# Patient Record
Sex: FEMALE | URBAN - METROPOLITAN AREA
[De-identification: names, ages, dates, MRNs, and addresses within clinical notes are randomized per-mention and may not be internally consistent; named-entity substitution may affect disease eponyms.]

---

## 2017-01-01 ENCOUNTER — APPOINTMENT (OUTPATIENT)
Dept: GENERAL RADIOLOGY | Facility: CLINIC | Age: 82
DRG: 283 | End: 2017-01-01
Attending: EMERGENCY MEDICINE
Payer: MEDICARE

## 2017-01-01 ENCOUNTER — TRANSFERRED RECORDS (OUTPATIENT)
Dept: HEALTH INFORMATION MANAGEMENT | Facility: CLINIC | Age: 82
End: 2017-01-01

## 2017-01-01 ENCOUNTER — APPOINTMENT (OUTPATIENT)
Dept: CT IMAGING | Facility: CLINIC | Age: 82
DRG: 283 | End: 2017-01-01
Attending: EMERGENCY MEDICINE
Payer: MEDICARE

## 2017-01-01 ENCOUNTER — HOSPITAL ENCOUNTER (INPATIENT)
Facility: CLINIC | Age: 82
LOS: 1 days | DRG: 283 | End: 2017-09-22
Attending: EMERGENCY MEDICINE | Admitting: INTERNAL MEDICINE
Payer: MEDICARE

## 2017-01-01 VITALS
TEMPERATURE: 98.6 F | RESPIRATION RATE: 20 BRPM | SYSTOLIC BLOOD PRESSURE: 95 MMHG | DIASTOLIC BLOOD PRESSURE: 56 MMHG | HEART RATE: 72 BPM | OXYGEN SATURATION: 90 %

## 2017-01-01 DIAGNOSIS — I48.91 ATRIAL FIBRILLATION, UNSPECIFIED TYPE (H): ICD-10-CM

## 2017-01-01 DIAGNOSIS — I21.4 NON-ST ELEVATION (NSTEMI) MYOCARDIAL INFARCTION (H): ICD-10-CM

## 2017-01-01 DIAGNOSIS — R41.82 ALTERED MENTAL STATUS, UNSPECIFIED ALTERED MENTAL STATUS TYPE: ICD-10-CM

## 2017-01-01 LAB
ABO + RH BLD: NORMAL
ABO + RH BLD: NORMAL
ALBUMIN SERPL-MCNC: 3.1 G/DL (ref 3.4–5)
ALBUMIN UR-MCNC: NEGATIVE MG/DL
ALP SERPL-CCNC: 95 U/L (ref 40–150)
ALT SERPL W P-5'-P-CCNC: 29 U/L (ref 0–50)
ANION GAP SERPL CALCULATED.3IONS-SCNC: 9 MMOL/L (ref 3–14)
APPEARANCE UR: CLEAR
AST SERPL W P-5'-P-CCNC: 110 U/L (ref 0–45)
BASOPHILS # BLD AUTO: 0 10E9/L (ref 0–0.2)
BASOPHILS NFR BLD AUTO: 0.1 %
BILIRUB SERPL-MCNC: 0.7 MG/DL (ref 0.2–1.3)
BILIRUB UR QL STRIP: NEGATIVE
BLD GP AB SCN SERPL QL: NORMAL
BLOOD BANK CMNT PATIENT-IMP: NORMAL
BUN SERPL-MCNC: 43 MG/DL (ref 7–30)
CALCIUM SERPL-MCNC: 8.9 MG/DL (ref 8.5–10.1)
CHLORIDE SERPL-SCNC: 104 MMOL/L (ref 94–109)
CO2 SERPL-SCNC: 27 MMOL/L (ref 20–32)
COLOR UR AUTO: YELLOW
CREAT SERPL-MCNC: 1.92 MG/DL (ref 0.52–1.04)
DIFFERENTIAL METHOD BLD: ABNORMAL
EOSINOPHIL # BLD AUTO: 0 10E9/L (ref 0–0.7)
EOSINOPHIL NFR BLD AUTO: 0 %
ERYTHROCYTE [DISTWIDTH] IN BLOOD BY AUTOMATED COUNT: 14.4 % (ref 10–15)
GFR SERPL CREATININE-BSD FRML MDRD: 24 ML/MIN/1.7M2
GLUCOSE SERPL-MCNC: 147 MG/DL (ref 70–99)
GLUCOSE UR STRIP-MCNC: NEGATIVE MG/DL
GRAN CASTS #/AREA URNS LPF: 1 /LPF
HCT VFR BLD AUTO: 41.1 % (ref 35–47)
HGB BLD-MCNC: 13.2 G/DL (ref 11.7–15.7)
HGB UR QL STRIP: ABNORMAL
HYALINE CASTS #/AREA URNS LPF: 6 /LPF (ref 0–2)
IMM GRANULOCYTES # BLD: 0.1 10E9/L (ref 0–0.4)
IMM GRANULOCYTES NFR BLD: 0.4 %
INR PPP: 1.11 (ref 0.86–1.14)
INTERPRETATION ECG - MUSE: NORMAL
KETONES UR STRIP-MCNC: NEGATIVE MG/DL
LEUKOCYTE ESTERASE UR QL STRIP: NEGATIVE
LYMPHOCYTES # BLD AUTO: 1.9 10E9/L (ref 0.8–5.3)
LYMPHOCYTES NFR BLD AUTO: 8.8 %
MCH RBC QN AUTO: 31.5 PG (ref 26.5–33)
MCHC RBC AUTO-ENTMCNC: 32.1 G/DL (ref 31.5–36.5)
MCV RBC AUTO: 98 FL (ref 78–100)
MONOCYTES # BLD AUTO: 1 10E9/L (ref 0–1.3)
MONOCYTES NFR BLD AUTO: 4.8 %
NEUTROPHILS # BLD AUTO: 18.1 10E9/L (ref 1.6–8.3)
NEUTROPHILS NFR BLD AUTO: 85.9 %
NITRATE UR QL: NEGATIVE
NRBC # BLD AUTO: 0 10*3/UL
NRBC BLD AUTO-RTO: 0 /100
PH UR STRIP: 5 PH (ref 5–7)
PLATELET # BLD AUTO: 149 10E9/L (ref 150–450)
POTASSIUM SERPL-SCNC: 5.3 MMOL/L (ref 3.4–5.3)
PROT SERPL-MCNC: 6.5 G/DL (ref 6.8–8.8)
RBC # BLD AUTO: 4.19 10E12/L (ref 3.8–5.2)
RBC #/AREA URNS AUTO: 1 /HPF (ref 0–2)
SODIUM SERPL-SCNC: 140 MMOL/L (ref 133–144)
SOURCE: ABNORMAL
SP GR UR STRIP: 1.01 (ref 1–1.03)
SPECIMEN EXP DATE BLD: NORMAL
TROPONIN I SERPL-MCNC: 3.04 UG/L (ref 0–0.04)
UROBILINOGEN UR STRIP-MCNC: NORMAL MG/DL (ref 0–2)
WBC # BLD AUTO: 21.1 10E9/L (ref 4–11)
WBC #/AREA URNS AUTO: 0 /HPF (ref 0–2)

## 2017-01-01 PROCEDURE — 25000128 H RX IP 250 OP 636

## 2017-01-01 PROCEDURE — 25000128 H RX IP 250 OP 636: Performed by: EMERGENCY MEDICINE

## 2017-01-01 PROCEDURE — 85025 COMPLETE CBC W/AUTO DIFF WBC: CPT | Performed by: EMERGENCY MEDICINE

## 2017-01-01 PROCEDURE — 84484 ASSAY OF TROPONIN QUANT: CPT | Performed by: EMERGENCY MEDICINE

## 2017-01-01 PROCEDURE — 85610 PROTHROMBIN TIME: CPT | Performed by: EMERGENCY MEDICINE

## 2017-01-01 PROCEDURE — 93005 ELECTROCARDIOGRAM TRACING: CPT

## 2017-01-01 PROCEDURE — 25000128 H RX IP 250 OP 636: Performed by: INTERNAL MEDICINE

## 2017-01-01 PROCEDURE — 40000275 ZZH STATISTIC RCP TIME EA 10 MIN

## 2017-01-01 PROCEDURE — 70450 CT HEAD/BRAIN W/O DYE: CPT

## 2017-01-01 PROCEDURE — 76705 ECHO EXAM OF ABDOMEN: CPT

## 2017-01-01 PROCEDURE — 86850 RBC ANTIBODY SCREEN: CPT | Performed by: EMERGENCY MEDICINE

## 2017-01-01 PROCEDURE — 71010 XR CHEST PORT 1 VW: CPT

## 2017-01-01 PROCEDURE — 99223 1ST HOSP IP/OBS HIGH 75: CPT | Mod: AI | Performed by: INTERNAL MEDICINE

## 2017-01-01 PROCEDURE — 86901 BLOOD TYPING SEROLOGIC RH(D): CPT | Performed by: EMERGENCY MEDICINE

## 2017-01-01 PROCEDURE — 72125 CT NECK SPINE W/O DYE: CPT

## 2017-01-01 PROCEDURE — 96374 THER/PROPH/DIAG INJ IV PUSH: CPT

## 2017-01-01 PROCEDURE — 80053 COMPREHEN METABOLIC PANEL: CPT | Performed by: EMERGENCY MEDICINE

## 2017-01-01 PROCEDURE — 12000000 ZZH R&B MED SURG/OB

## 2017-01-01 PROCEDURE — 99285 EMERGENCY DEPT VISIT HI MDM: CPT | Mod: 25

## 2017-01-01 PROCEDURE — 86900 BLOOD TYPING SEROLOGIC ABO: CPT | Performed by: EMERGENCY MEDICINE

## 2017-01-01 PROCEDURE — 81001 URINALYSIS AUTO W/SCOPE: CPT | Performed by: EMERGENCY MEDICINE

## 2017-01-01 RX ORDER — BISACODYL 10 MG
10 SUPPOSITORY, RECTAL RECTAL
Status: DISCONTINUED | OUTPATIENT
Start: 2017-09-25 | End: 2017-09-23 | Stop reason: HOSPADM

## 2017-01-01 RX ORDER — HYDROMORPHONE HYDROCHLORIDE 1 MG/ML
INJECTION, SOLUTION INTRAMUSCULAR; INTRAVENOUS; SUBCUTANEOUS
Status: COMPLETED
Start: 2017-01-01 | End: 2017-01-01

## 2017-01-01 RX ORDER — ACETAMINOPHEN 325 MG/1
650 TABLET ORAL EVERY 6 HOURS PRN
Status: DISCONTINUED | OUTPATIENT
Start: 2017-01-01 | End: 2017-09-23 | Stop reason: HOSPADM

## 2017-01-01 RX ORDER — PROCHLORPERAZINE MALEATE 5 MG
5 TABLET ORAL EVERY 6 HOURS PRN
Status: DISCONTINUED | OUTPATIENT
Start: 2017-01-01 | End: 2017-09-23 | Stop reason: HOSPADM

## 2017-01-01 RX ORDER — GLYCOPYRROLATE 0.2 MG/ML
.2-.4 INJECTION, SOLUTION INTRAMUSCULAR; INTRAVENOUS EVERY 4 HOURS PRN
Status: DISCONTINUED | OUTPATIENT
Start: 2017-01-01 | End: 2017-09-23 | Stop reason: HOSPADM

## 2017-01-01 RX ORDER — HALOPERIDOL 5 MG/ML
.5-1 INJECTION INTRAMUSCULAR
Status: DISCONTINUED | OUTPATIENT
Start: 2017-01-01 | End: 2017-09-23 | Stop reason: HOSPADM

## 2017-01-01 RX ORDER — FENTANYL CITRATE 50 UG/ML
INJECTION, SOLUTION INTRAMUSCULAR; INTRAVENOUS
Status: DISCONTINUED
Start: 2017-01-01 | End: 2017-01-01 | Stop reason: HOSPADM

## 2017-01-01 RX ORDER — ONDANSETRON 4 MG/1
4 TABLET, ORALLY DISINTEGRATING ORAL EVERY 6 HOURS PRN
Status: DISCONTINUED | OUTPATIENT
Start: 2017-01-01 | End: 2017-09-23 | Stop reason: HOSPADM

## 2017-01-01 RX ORDER — ONDANSETRON 2 MG/ML
4 INJECTION INTRAMUSCULAR; INTRAVENOUS EVERY 6 HOURS PRN
Status: DISCONTINUED | OUTPATIENT
Start: 2017-01-01 | End: 2017-09-23 | Stop reason: HOSPADM

## 2017-01-01 RX ORDER — PROCHLORPERAZINE 25 MG
12.5 SUPPOSITORY, RECTAL RECTAL EVERY 12 HOURS PRN
Status: DISCONTINUED | OUTPATIENT
Start: 2017-01-01 | End: 2017-09-23 | Stop reason: HOSPADM

## 2017-01-01 RX ORDER — HYDROMORPHONE HYDROCHLORIDE 1 MG/ML
1-2 SOLUTION ORAL
Status: DISCONTINUED | OUTPATIENT
Start: 2017-01-01 | End: 2017-09-23 | Stop reason: HOSPADM

## 2017-01-01 RX ORDER — FENTANYL CITRATE 50 UG/ML
100 INJECTION, SOLUTION INTRAMUSCULAR; INTRAVENOUS ONCE
Status: DISCONTINUED | OUTPATIENT
Start: 2017-01-01 | End: 2017-09-23 | Stop reason: HOSPADM

## 2017-01-01 RX ORDER — HYDROMORPHONE HYDROCHLORIDE 1 MG/ML
.3-.5 INJECTION, SOLUTION INTRAMUSCULAR; INTRAVENOUS; SUBCUTANEOUS
Status: DISCONTINUED | OUTPATIENT
Start: 2017-01-01 | End: 2017-09-23 | Stop reason: HOSPADM

## 2017-01-01 RX ORDER — ATROPINE SULFATE 10 MG/ML
1-2 SOLUTION/ DROPS OPHTHALMIC
Status: DISCONTINUED | OUTPATIENT
Start: 2017-01-01 | End: 2017-09-23 | Stop reason: HOSPADM

## 2017-01-01 RX ORDER — FENTANYL CITRATE 50 UG/ML
50 INJECTION, SOLUTION INTRAMUSCULAR; INTRAVENOUS ONCE
Status: COMPLETED | OUTPATIENT
Start: 2017-01-01 | End: 2017-01-01

## 2017-01-01 RX ORDER — ACETAMINOPHEN 650 MG/1
650 SUPPOSITORY RECTAL EVERY 6 HOURS PRN
Status: DISCONTINUED | OUTPATIENT
Start: 2017-01-01 | End: 2017-09-23 | Stop reason: HOSPADM

## 2017-01-01 RX ADMIN — HYDROMORPHONE HYDROCHLORIDE 0.5 MG: 1 INJECTION, SOLUTION INTRAMUSCULAR; INTRAVENOUS; SUBCUTANEOUS at 14:24

## 2017-01-01 RX ADMIN — HYDROMORPHONE HYDROCHLORIDE 0.5 MG: 1 INJECTION, SOLUTION INTRAMUSCULAR; INTRAVENOUS; SUBCUTANEOUS at 20:59

## 2017-01-01 RX ADMIN — HYDROMORPHONE HYDROCHLORIDE 0.5 MG: 1 INJECTION, SOLUTION INTRAMUSCULAR; INTRAVENOUS; SUBCUTANEOUS at 16:53

## 2017-01-01 RX ADMIN — HYDROMORPHONE HYDROCHLORIDE 0.5 MG: 1 INJECTION, SOLUTION INTRAMUSCULAR; INTRAVENOUS; SUBCUTANEOUS at 19:50

## 2017-01-01 RX ADMIN — HYDROMORPHONE HYDROCHLORIDE 0.5 MG: 1 INJECTION, SOLUTION INTRAMUSCULAR; INTRAVENOUS; SUBCUTANEOUS at 23:21

## 2017-01-01 RX ADMIN — FENTANYL CITRATE 50 MCG: 50 INJECTION, SOLUTION INTRAMUSCULAR; INTRAVENOUS at 13:13

## 2017-01-01 RX ADMIN — HYDROMORPHONE HYDROCHLORIDE 0.5 MG: 1 INJECTION, SOLUTION INTRAMUSCULAR; INTRAVENOUS; SUBCUTANEOUS at 18:04

## 2017-09-22 PROBLEM — I21.9 AMI (ACUTE MYOCARDIAL INFARCTION) (H): Status: ACTIVE | Noted: 2017-01-01

## 2017-09-22 NOTE — PROGRESS NOTES
SPIRITUAL HEALTH SERVICES Progress Note  [facility]    D:  responded to page. Family requested . Contact keshawn culver and  is now en route.        Guicho Crowder  Chaplain Resident

## 2017-09-22 NOTE — ED PROVIDER NOTES
History     Chief Complaint:  Altered mental status    HPI : History limited to EMS report due to unresponsive patient.  Geeta Ramos is a 89 year old female who presents via EMS unresponsive. Per EMS, the patient was found unresponsive by staff at her independent living facility. They went into her room because of a welfare check request one of her relatives had made. When they found her, she was slumped on the floor, and appeared to have a right-sided facial deficit. EMS reports she had a blood pressure in the ambulance of 70/43. Blood sugar was 151.    Additional history was obtained from the daughter, who a nurse spoke to on the phone. The patient apparently had a mild heart attack two weeks ago. The patient is DNR/DNI.     Allergies:  The patient has no known drug allergies.     Medications:    Unknown.    Past Medical History:    CAD  CHF    Past Surgical History:    Unknown.    Family History:    Unknown.    Social History:  The patient presents via EMS.  Patient lives in independent living.     Review of Systems   Unable to perform ROS: Patient unresponsive       Physical Exam   First Vitals:  BP: 95/56  Temp: 98.6  F (37  C)  Pulse: 72  Resp: 20  SpO2: 90 %    Physical Exam  Nursing note and vitals reviewed.  Constitutional:  Unresponsive.   HENT:   Nose:    Nose normal.   Mouth/Throat:   Mucous membranes are normal.   Eyes:    Fixed and dilated, without any eye movements present.   Neck:    Trachea normal.   Cardiovascular:  Normal rate, irregularly irregular rhythm, normal heart sounds and normal pulses. No murmur heard.  Pulmonary/Chest:  Coarse breath sounds present and spontaneous respirations present.  Abdominal:   Normal appearance and bowel sounds are normal.      Soft, but moans slightly with palpation.     There is no rebound and no CVA tenderness.   Musculoskeletal:  Extremities atraumatic x 4.   Lymphadenopathy:  No cervical adenopathy.   Neurological:   Unresponsive, with GCS 3.  Skin:     Skin is intact. No rash noted.   Psychiatric:   Unresponsive.      Emergency Department Course   ECG (11:39:18):  Indication: Unresponsive.   Rate 76 bpm. TX interval *. QRS duration 82. QT/QTc 436/490. P-R-T axes 14.   Interpretation: Atrial fibrillation. Septal infarct, age undetermined.  Agree with computer interpretation.   Interpreted at 1140 by Dr. Camacho.    Imaging:  Radiographic findings were communicated with the patient who voiced understanding of the findings.    Head CT, without contrast, per radiology:   1. No evidence of acute intracranial hemorrhage, mass, or herniation.  2. There is generalized atrophy of the brain. White matter changes are present in the cerebral hemispheres that are consistent with small vessel ischemic disease in this age patient.     Cervical spine CT, without contrast, per radiology:   1. No evidence of acute fracture or subluxation in the cervical spine.  2. Multilevel degenerative changes throughout the cervical spine as described above.   3. 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 The pannus causes moderate narrowing of the spinal canal at the level of C1-C2.     Portable Chest XR, per radiology:   Cardiomegaly. Nonspecific increased interstitial markings throughout both lungs. Findings could represent somewhat atypical pulmonary edema or could represent chronic fibrosis. Atherosclerotic  aortic calcification. No focal infiltrates.     POC Ultrasound Abdomen  Aorta appears normal in size. No free intra-abdominal fluid. Minimal cardiac contractility.    Laboratory:  CBC: WBC 21.1 (H), HGB 13.2,  (L)  CMP: Glucose 147 (H), BUN 43 (H), GFR 24 (L), Albumin 3.1 (L), Protein 6.5 (L),  (H), Creatinine 1.92 (H), o/w WNL  1135: Troponin I: 3.035 (HH)  1135: INR: 1.11  ABO/Rh: ABO AB, Rh positive, Antibody screen negative  UA: Clear, yellow urine: Blood trace (A), Hyaline casts 6 (H), Granular casts 1 (A), o/w  WNL    Interventions:  1313: Sublimaze, 50 mcg, IV    Emergency Department Course:  Nursing notes and vitals reviewed.  I performed an exam of the patient as documented above.  The above workup was undertaken.   I rechecked the patient.  1324: I discussed the patient with Dr. Tan of the hospitalist service.     Findings and plan explained to the patient's family members who consents to admission. Discussed the patient with Dr. Tan, who will admit the patient to a  bed for comfort care.      Impression & Plan      Medical Decision Making:  Geeta Ramos is a 89 year old female brought in by EMS for altered mental status and being minimally responsive. Given her age, I wanted to see if we could find out more about the patient s code status and advanced directives prior to initiating interventions. We spoke with her daughter almost immediately upon arrival, and her daughter indicated the patient is DNR/DNI, and the patient and family would not want anything aggressive. Therefore, I did not intubate her. She had a portable chest XR, as well as blood work, UA, and EKG. I sent her for a CT of her head and neck to rule out an intracranial hemorrhage, skull fracture, or C-spine fracture. None of those scans show anything acute. It is unclear if she is on a blood thinner or not, or if she has had a-fib previously. Regardless, she does appear to have a terminal type of presentation, as she was completely unresponsive, and her eyes would not move, and her pupils were fixed and dilated. On speaking with her son here, he agreed that making her comfortable was the appropriate way to proceed. I am agreement with that as well. We did provide IV fentanyl for any pain that she might be having. I also did an ultrasound of her abdomen to make sure there was not an aortic aneurysm or any free intra-abdominal fluid, and I also wanted to look at her heart function. While she had no signs of an aortic aneurysm or free fluid in her  abdomen, she had very minimal contractility to her heart. We will admit her to the hospital for further comfort care, and I spoke with Dr. Tan, who will be admitting her.     Critical Care time: was 35 minutes for this patient excluding procedures.    Diagnosis:    ICD-10-CM   1. Altered mental status, unspecified altered mental status type R41.82   2. Non-ST elevation (NSTEMI) myocardial infarction (H) I21.4   3. Atrial fibrillation, unspecified type (H) I48.91     Disposition:  Admit to Dr. Tan. Comfort care only.    I, Samir Camacho, am serving as a scribe on 9/22/2017 at 11:32 AM to personally document services performed by Shankar Camacho MD, based on my observations and the provider's statements to me.     EMERGENCY DEPARTMENT       Shankar Camacho MD  09/22/17 1970

## 2017-09-22 NOTE — ED NOTES
Bed: ST01  Expected date:   Expected time:   Means of arrival:   Comments:  [facility] - 89- unresponsive

## 2017-09-22 NOTE — H&P
"[facility]    History and Physical  Hospitalist       Date of Admission:  9/22/2017    Assessment & Plan   Geeta Ramos is a 89 year old female who presents unresponsive, admitted 9/22/2017 for comfort cares.     1. Suspected acute myocardial infarction  2. Suspected anoxic brain injury  3. Acute hypoxic respiratory failure   4. Acute kidney injury on chronic kidney disease stage III  5. Acute congestive heart failure of unspecified type  6. Chronic diastolic congestive heart failure  7. Chronic atrial fibrillation  8. History of RV thrombus  9. History of tobacco abuse   10. Leukocytosis, possible stress leukocytosis    The patient presents after being found down and unresponsive at her living facility. She has remained unresponsive in the emergency department.  Laboratory evaluation is concerning for acute myocardial infarction with a troponin of 3.035, and AST of 110 which suspect is cardiac in origin. She does not have any ST elevations on her EKG.  Given her otherwise usual state of health within 12 hours of being found down, suspect an acute event, such as an acute myocardial infarction as the underlying etiology of her death. Her bedside ultrasound in the Emergency Department by Dr. Camacho demonstrated minimal cardiac contractility. This likely led to congestive heart failure, causing hypoxia and subsequently an anoxic brain injury given her significant hypoxia on admission and overall unresponsive state. I have discussed her current condition with her son as well as her daughter who is her reported healthcare agent and medical decision maker.  They have clearly indicated that her wishes were for do not resuscitate and do not intubate, she has made recent comments about having \"lived a good life\" and when her rapid decline and imminent death was discussed with the family they both reported \"this was the way she wanted to go.\"    - Admit for comfort cares  - Comfort care order set " initiated, hydromorphone p.r.n. given her underlying renal dysfunction  - Continue oxygen for now, will revisit with daughter on her arrival if family wishes to continue  - Daughter plans to contact her other siblings  - Anticipate patient will die while hospitalized, would not pursue any discharge planning    ADDENDUM:  - Discussed with family and RN, entered patient care order to pre-medicate with hydromorphone 15 minutes prior to oxygen removal if family requests  - Discussed with family, have declined autopsy    DVT Prophylaxis: Not indicated in comfort cares  Code Status: DNR / DNI - Discussed with son and daughter    Disposition: Admit to inpatient for comfort cares. Anticipate patient will die while hospitalized.     Silvestre Tan    Chief Complaint   Unresponsive, found down    Unable to obtain a history from the patient due to unresponsiveness. History is obtained from patient's son, daughter and review of outside records.     History of Present Illness   Geeta Ramos is a 89 year old female who presents with the above chief complaint.    Per her son and daughter, the patient has been in her usual state of health as recently as the afternoon prior to admission. She did not report any concerning symptoms at that time to her daughter.  The next time that she was seen, she was found down in her assisted living facility unresponsive.  She was subsequent transported to the [facility] Emergency Department for further evaluation.     In the Emergency Department, the patient was seen by Dr. Shankar Camacho, with whom I discussed the patient's presenting symptoms and emergency department course.  Initial vital signs were temperature of 98.2F, HR 75, /63, RR 35, SpO2 81% without initial oxygen delivery documented (later documented as 15 LPM). Work-up included CXR with cardiomegaly and increased interstitial markings, CT cervical spine without evidence for fracture, head CT without bleed, POC US with  normal aorta, no intra-abdominal fluid, minimal cardiac contractility. EKG demonstrated atrial fibrillation without ST-elevations. Labs notable for Creatinine 1.92, BUN 43, , WBC 21.1, troponin 3.035, UA with trace blood, 6 hyaline casts, 1 granular cast on catheterized specimen, otherwise unremarkable. Her condition was discussed with the patient's son and her daughter, with DNR/DNI and no aggressive measure treatment plan determined. Hospitalists were contacted for admission to the hospital.     Past Medical History    I have reviewed this patient's medical history and updated it with pertinent information if needed.     Chronic atrial fibrillation  RV thrombus  NSTEMI  Diastolic congestive heart failure   Chronic kidney disease stage III  Gout  Osteoporosis  History of tobacco abuse  Breast cancer s/p left mastectomy  Hyperlipidemia  Glaucoma  Constipation     Past Surgical History   I have reviewed this patient's surgical history and updated it with pertinent information if needed.    Left mastectomy     Allergies   Azithromycin - elevated LFTs    Social History   Former smoker, quit 50 years ago. No alcohol use. Has 7 children. Brenda is designated healthcare agent by report, and designated contact on nursing home form.     Family History   I have reviewed this patient's family history and updated it with pertinent information if needed.     Reviewed in outside records, non-contributory to chief complaint given patient's age.     Review of Systems   Unable to obtain review of systems as patient is unresponsive. Pertinent information able to be obtained included in HPI.     Physical Exam   Temp: 98.6  F (37  C)   BP: 95/56 Pulse: 72 Heart Rate: 75 Resp: 20 SpO2: 90 % O2 Device: Oxymask Oxygen Delivery: 15 LPM  Vital Signs with Ranges  Temp:  [98.2  F (36.8  C)-98.8  F (37.1  C)] 98.6  F (37  C)  Pulse:  [72] 72  Heart Rate:  [72-83] 75  Resp:  [14-40] 20  BP: ()/(47-78) 95/56  SpO2:  [77 %-99 %] 90  %  0 lbs 0 oz    Constitutional: Unresponsive  Eyes: Pupils fixed and small bilaterally   HENT: Oxygen mask in place   Respiratory: Bibasilar crackles, breathing appears non-labored  Cardiovascular: Faint heart sounds, irregular. No peripheral edema. Thready peripheral pulses  GI: Soft, no apparent tenderness, non-distended  Skin: Warm, dry  Neurologic: Pupils fixed. Unresponsive to voice, unable to follow commands. No withdrawal to pain.   Psychiatric: Unable to assess    Data   Data reviewed today:  I personally reviewed the EKG tracing showing atrial fibrillation, T wave inversion in V2, aVR, aVL.    Recent Labs  Lab 09/22/17  1135   WBC 21.1*   HGB 13.2   MCV 98   *   INR 1.11      POTASSIUM 5.3   CHLORIDE 104   CO2 27   BUN 43*   CR 1.92*   ANIONGAP 9   JOSE ALFREDO 8.9   *   ALBUMIN 3.1*   PROTTOTAL 6.5*   BILITOTAL 0.7   ALKPHOS 95   ALT 29   *   TROPI 3.035*       Recent Results (from the past 24 hour(s))   XR Chest Port 1 View    Narrative    XR CHEST PORT 1 VW  9/22/2017 11:55 AM    HISTORY:  unresponsive    COMPARISON:  None.      Impression    IMPRESSION:  Cardiomegaly. Nonspecific increased interstitial markings  throughout both lungs. Findings could represent somewhat atypical  pulmonary edema or could represent chronic fibrosis. Atherosclerotic  aortic calcification. No focal infiltrates.     TAMI COLLAZO MD   CT Cervical Spine w/o Contrast    Narrative    CT CERVICAL SPINE WITHOUT CONTRAST   9/22/2017 12:23 PM     HISTORY: Fall, unresponsive.     TECHNIQUE: Axial images of the cervical spine were obtained without  intravenous contrast. Multiplanar reformations were performed.   Radiation dose for this scan was reduced using automated exposure  control, adjustment of the mA and/or kV according to patient size, or  iterative reconstruction technique.    COMPARISON: None.    FINDINGS: No evidence of fracture. No prevertebral soft tissue  swelling. There is grade 1 anterolisthesis of  C4 on C5 and T1 on T2.  Vertebral body height is maintained. There is erosive changes along  the posterior aspect of the dens with a large pannus formation at the  atlantooccipital joint that causes moderate narrowing of the spinal  canal at C1-C2. There are multiple levels of loss of intervertebral  disc height with degenerative endplate changes throughout the cervical  spine, most pronounced at C5-6 and C6-7. Severe facet hypertrophy is  seen throughout the cervical spine.    Level by level as follows:    C2-C3: Bilateral facet hypertrophy without significant spinal canal or  neural foraminal narrowing.     C3-C4:  Left greater than right facet hypertrophy and uncinate  spurring results in moderate left neural foraminal narrowing.  Posterior disc bulge and endplate osteophytes causes mild spinal canal  narrowing. No significant right neural foraminal narrowing.     C4-C5:  Grade 1 anterolisthesis along with posterior disc bulge  results in mild spinal canal narrowing. Left greater than right facet  hypertrophy and uncinate spurring results in moderate right and mild  left neural foraminal narrowing.     C5-C6:  Calcified posterior disc bulge and endplate osteophytic  spurring results in moderate spinal canal narrowing. Bilateral facet  hypertrophy and uncinate spurring results in severe right and moderate  left neural foraminal narrowing.      C6-C7:  Posterior disc bulge and endplate osteophytic spurring results  in moderate spinal canal narrowing. Bilateral uncinate spurring and  facet hypertrophy results in moderate bilateral neural foraminal  narrowing.     C7-T1: Bilateral facet hypertrophy and uncinate spurring results in  moderate bilateral neural foraminal narrowing. No spinal canal  narrowing.     Visualized paraspinous tissues: Atherosclerotic calcifications of the  carotid bifurcations bilaterally. Mild interstitial thickening at the  visualized lung apices.      Impression    IMPRESSION:   1. No  evidence of acute fracture or subluxation in the cervical spine.  2. Multilevel degenerative changes throughout the cervical spine as  described above.   3. 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  The pannus causes moderate narrowing of the spinal canal at the level  of C1-C2.     KRISTYN MARMOLEJO MD   CT Head w/o Contrast    Narrative    CT SCAN OF THE HEAD WITHOUT CONTRAST   9/22/2017 12:24 PM     HISTORY: Fall, head injury.    TECHNIQUE:  Axial images of the head and coronal reformations without  IV contrast material. Radiation dose for this scan was reduced using  automated exposure control, adjustment of the mA and/or kV according  to patient size, or iterative reconstruction technique.    COMPARISON: None.    FINDINGS: There is no evidence of intracranial hemorrhage, mass, acute  infarct or anomaly. The ventricles are normal in size, shape and  configuration. The brain parenchyma and subarachnoid spaces are  normal. Moderate diffuse peripheral volume loss. Mild burden of  periventricular white matter hypodensities which are nonspecific, but  likely related to chronic microvascular ischemic disease.    The bony calvarium and bones of the skull base appear intact. There is  almost complete opacification of the left sphenoid sinus. Periosteal  thickening of the left sphenoid sinus suggests a chronic component of  sinusitis. There are bilateral intraocular lens implants.      Impression    IMPRESSION:     1. No evidence of acute intracranial hemorrhage, mass, or herniation.  2. There is generalized atrophy of the brain. White matter changes are  present in the cerebral hemispheres that are consistent with small  vessel ischemic disease in this age patient.      KRISTYN MARMOLEJO MD   POC US ABDOMEN LIMITED (FAST/RUQ)    Impression    Aorta appears normal in size. No free intra-abdominal fluid. Minimal cardiac contractility.

## 2017-09-22 NOTE — ED NOTES
[facility]  ED Nurse Handoff Report    ED Chief complaint: Altered Mental Status (pt brought in by EMS from senior living. A friend had called 911 for welfare check. pt was found unresponsive on floor at home. Daughter POA was called on pt arrival and sts pt is DNR/DNI. Daughter last talked to pt yesterday at 4p.)      ED Diagnosis:   Final diagnoses:   Altered mental status, unspecified altered mental status type   Non-ST elevation (NSTEMI) myocardial infarction (H)   Atrial fibrillation, unspecified type (H)       Code Status: DNR / DNI    Allergies: Allergies not on file    Activity level - Baseline/Home:  Independent    Activity Level - Current:   Total Care     Needed?: No    Isolation: No  Infection: Not Applicable    Bariatric?: No    Vital Signs:   Vitals:    09/22/17 1226 09/22/17 1245 09/22/17 1300 09/22/17 1315   BP:  137/78 139/77 112/72   Pulse:       Resp: (!) 33 28 26 26   Temp:  98.2  F (36.8  C) 98.6  F (37  C) 98.8  F (37.1  C)   SpO2: (!) 82% (!) 81% 99% (!) 89%       Cardiac Rhythm: ,        Pain level:      Is this patient confused?: Yes    Patient Report:  History limited to EMS report due to unresponsive patient  Geeta Ramos is a 89 year old female who presents unresponsive. Per EMS, the patient was found unresponsive by staff at her independent living facility. They went into her room because of a welfare check request one of her relatives had made. When they found her, she was slumped on the floor, and appeared to have a right-sided facial deficit. EMS reports she had a blood pressure in the ambulance of 70/43. Blood sugar was 151.     Additional history was obtained from the daughter, who a nurse spoke to on the phone. The patient had a mild heart attack two weeks ago. The patient is DNR/DNI.      Focused Assessment: PT ARRIVES UNRESPONSIVE.   Tests Performed: LABS, xr,ct, bedside US  Abnormal Results: see results  Treatments provided:     Family Comments: PT  FAMILY CALLED AND SON CURRENTLY AT BEDSIDE    OBS brochure/video discussed/provided to patient: No    ED Medications:   Medications   fentaNYL (PF) (SUBLIMAZE) injection 100 mcg (not administered)   fentaNYL (PF) (SUBLIMAZE) injection 50 mcg (50 mcg Intravenous Given 9/22/17 1313)       Drips infusing?:  No      ED NURSE PHONE NUMBER: [phone]

## 2017-09-22 NOTE — PLAN OF CARE
Problem: Patient Care Overview  Goal: Plan of Care/Patient Progress Review  Outcome: Declining  ED admission at 1400. Pt remains unresponsive, narinder UE cold to touch, nailbeds blue on fingers. Gasping breaths, O2 rebreather on at 10L, family requests to leave on until more family members arrive. Dilaudid PRN given, indicated by moaning when repos and gasping breaths. Swan patent. Redness to left scapula and redness and suspected shear injury to left hip.   Family requests she be kept comfortable.  notified.

## 2017-09-23 LAB — GLUCOSE BLDC GLUCOMTR-MCNC: 149 MG/DL (ref 70–99)

## 2017-09-23 NOTE — PLAN OF CARE
Problem: Patient Care Overview  Goal: Plan of Care/Patient Progress Review  Outcome: No Change  Pt on comfort care, actively dying. Family by the bedside. Turned and repo per family requests. PRN dilaudid given per family requests. Pt moans a little during repo, respiration is shallow and apneic at times.has a skin tear and blanchable redness on posterior aspect of the right thigh. Covered with mepilex. Off oxymask. Continue to provide comfort care.

## 2017-09-23 NOTE — DISCHARGE SUMMARY
[facility]  Discharge/Death Summary        Geeta Ramos MRN# [id number]   YOB: 1923 Age: 89 year old     Date of Admission: 9/22/2017  Date of Death:  9/22/2017  Admitting Physician: Silvestre Tan MD  Discharge Physician: Silvestre Tan MD  Discharging Service: Hospitalist            Discharge Diagnoses:      1. Suspected acute myocardial infarction  2. Suspected anoxic brain injury  3. Acute hypoxic respiratory failure   4. Acute kidney injury on chronic kidney disease stage III  5. Acute congestive heart failure of unspecified type  6. Chronic diastolic congestive heart failure  7. Chronic atrial fibrillation  8. History of RV thrombus  9. History of tobacco abuse   10. Leukocytosis, possible stress leukocytosis        Hospital Course:    Geeta Ramos was admitted on 9/22/2017 by Silvestre Tan MD and I would refer you to their history and physical.  The following problems were addressed during her hospitalization:    The patient presents after being found down and unresponsive at her living facility. She remained unresponsive in the emergency department.  Laboratory evaluation concerning for acute myocardial infarction with a troponin of 3.035, and AST of 110 which suspect is cardiac in origin. She does not have any ST elevations on her EKG.  Given her otherwise usual state of health within 12 hours of being found down, suspect an acute event, such as an acute myocardial infarction as the underlying etiology of her death. Her bedside ultrasound in the Emergency Department by Dr. Camacho demonstrated minimal cardiac contractility. This likely led to congestive heart failure, causing hypoxia and subsequently an anoxic brain injury given her significant hypoxia on admission and overall unresponsive state. Discussed her current condition with her son as well as her daughter who is her reported healthcare agent and medical decision maker.  They have clearly indicated that her  wishes were for do not resuscitate and do not intubate. She was made comfort cares and  17.        Allergies:       Allergies not on file        Image Results From This Hospital Stay (For Non-EPIC Providers):        Results for orders placed or performed during the hospital encounter of 17   XR Chest Port 1 View    Narrative    XR CHEST PORT 1 VW  2017 11:55 AM    HISTORY:  unresponsive    COMPARISON:  None.      Impression    IMPRESSION:  Cardiomegaly. Nonspecific increased interstitial markings  throughout both lungs. Findings could represent somewhat atypical  pulmonary edema or could represent chronic fibrosis. Atherosclerotic  aortic calcification. No focal infiltrates.     TAMI COLLAZO MD   CT Head w/o Contrast    Narrative    CT SCAN OF THE HEAD WITHOUT CONTRAST   2017 12:24 PM     HISTORY: Fall, head injury.    TECHNIQUE:  Axial images of the head and coronal reformations without  IV contrast material. Radiation dose for this scan was reduced using  automated exposure control, adjustment of the mA and/or kV according  to patient size, or iterative reconstruction technique.    COMPARISON: None.    FINDINGS: There is no evidence of intracranial hemorrhage, mass, acute  infarct or anomaly. The ventricles are normal in size, shape and  configuration. The brain parenchyma and subarachnoid spaces are  normal. Moderate diffuse peripheral volume loss. Mild burden of  periventricular white matter hypodensities which are nonspecific, but  likely related to chronic microvascular ischemic disease.    The bony calvarium and bones of the skull base appear intact. There is  almost complete opacification of the left sphenoid sinus. Periosteal  thickening of the left sphenoid sinus suggests a chronic component of  sinusitis. There are bilateral intraocular lens implants.      Impression    IMPRESSION:     1. No evidence of acute intracranial hemorrhage, mass, or herniation.  2. There is generalized  atrophy of the brain. White matter changes are  present in the cerebral hemispheres that are consistent with small  vessel ischemic disease in this age patient.      KRISTYN MARMOLEJO MD   CT Cervical Spine w/o Contrast    Narrative    CT CERVICAL SPINE WITHOUT CONTRAST   9/22/2017 12:23 PM     HISTORY: Fall, unresponsive.     TECHNIQUE: Axial images of the cervical spine were obtained without  intravenous contrast. Multiplanar reformations were performed.   Radiation dose for this scan was reduced using automated exposure  control, adjustment of the mA and/or kV according to patient size, or  iterative reconstruction technique.    COMPARISON: None.    FINDINGS: No evidence of fracture. No prevertebral soft tissue  swelling. There is grade 1 anterolisthesis of C4 on C5 and T1 on T2.  Vertebral body height is maintained. There is erosive changes along  the posterior aspect of the dens with a large pannus formation at the  atlantooccipital joint that causes moderate narrowing of the spinal  canal at C1-C2. There are multiple levels of loss of intervertebral  disc height with degenerative endplate changes throughout the cervical  spine, most pronounced at C5-6 and C6-7. Severe facet hypertrophy is  seen throughout the cervical spine.    Level by level as follows:    C2-C3: Bilateral facet hypertrophy without significant spinal canal or  neural foraminal narrowing.     C3-C4:  Left greater than right facet hypertrophy and uncinate  spurring results in moderate left neural foraminal narrowing.  Posterior disc bulge and endplate osteophytes causes mild spinal canal  narrowing. No significant right neural foraminal narrowing.     C4-C5:  Grade 1 anterolisthesis along with posterior disc bulge  results in mild spinal canal narrowing. Left greater than right facet  hypertrophy and uncinate spurring results in moderate right and mild  left neural foraminal narrowing.     C5-C6:  Calcified posterior disc bulge and endplate  osteophytic  spurring results in moderate spinal canal narrowing. Bilateral facet  hypertrophy and uncinate spurring results in severe right and moderate  left neural foraminal narrowing.      C6-C7:  Posterior disc bulge and endplate osteophytic spurring results  in moderate spinal canal narrowing. Bilateral uncinate spurring and  facet hypertrophy results in moderate bilateral neural foraminal  narrowing.     C7-T1: Bilateral facet hypertrophy and uncinate spurring results in  moderate bilateral neural foraminal narrowing. No spinal canal  narrowing.     Visualized paraspinous tissues: Atherosclerotic calcifications of the  carotid bifurcations bilaterally. Mild interstitial thickening at the  visualized lung apices.      Impression    IMPRESSION:   1. No evidence of acute fracture or subluxation in the cervical spine.  2. Multilevel degenerative changes throughout the cervical spine as  described above.   3. 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  The pannus causes moderate narrowing of the spinal canal at the level  of C1-C2.     KRISTYN MARMOLEJO MD   POC US ABDOMEN LIMITED (FAST/RUQ)    Impression    Aorta appears normal in size. No free intra-abdominal fluid. Minimal cardiac contractility.           Most Recent Lab Results In EPIC (For Non-EPIC Providers):    Most Recent 3 CBC's:  Recent Labs   Lab Test  09/22/17   1135   WBC  21.1*   HGB  13.2   MCV  98   PLT  149*      Most Recent 3 BMP's:  Recent Labs   Lab Test  09/22/17   1135   NA  140   POTASSIUM  5.3   CHLORIDE  104   CO2  27   BUN  43*   CR  1.92*   ANIONGAP  9   JOSE ALFREDO  8.9   GLC  147*     Most Recent 3 Troponin's:  Recent Labs   Lab Test  09/22/17   1135   TROPI  3.035*     Most Recent 3 INR's:  Recent Labs   Lab Test  09/22/17   1135   INR  1.11     Most Recent 2 LFT's:  Recent Labs   Lab Test  09/22/17   1135   AST  110*   ALT  29   ALKPHOS  95   BILITOTAL  0.7     Most Recent Cholesterol Panel:No lab results found.  Most  Recent 6 Bacteria Isolates From Any Culture (See EPIC Reports for Culture Details):No lab results found.  Most Recent TSH, T4 and HgbA1c: No lab results found.

## 2017-09-23 NOTE — PROGRESS NOTES
House MD note. Re: Pronouncement.    Patient pronounced dead at 23:45 hours on September 22, 2017.  No spontaneous respirations. No S1-S2.    Noah Xiong MD  9/23/2017  12:13 AM

## 2022-06-04 ENCOUNTER — TELEPHONE ENCOUNTER (OUTPATIENT)
Dept: URBAN - METROPOLITAN AREA CLINIC 68 | Facility: CLINIC | Age: 87
End: 2022-06-04

## 2022-06-05 ENCOUNTER — TELEPHONE ENCOUNTER (OUTPATIENT)
Dept: URBAN - METROPOLITAN AREA CLINIC 68 | Facility: CLINIC | Age: 87
End: 2022-06-05

## 2022-06-25 ENCOUNTER — TELEPHONE ENCOUNTER (OUTPATIENT)
Age: 87
End: 2022-06-25

## 2022-06-26 ENCOUNTER — TELEPHONE ENCOUNTER (OUTPATIENT)
Age: 87
End: 2022-06-26